# Patient Record
Sex: MALE | Race: WHITE | NOT HISPANIC OR LATINO | Employment: FULL TIME | URBAN - METROPOLITAN AREA
[De-identification: names, ages, dates, MRNs, and addresses within clinical notes are randomized per-mention and may not be internally consistent; named-entity substitution may affect disease eponyms.]

---

## 2021-10-07 ENCOUNTER — OFFICE VISIT (OUTPATIENT)
Dept: URGENT CARE | Facility: CLINIC | Age: 21
End: 2021-10-07
Payer: COMMERCIAL

## 2021-10-07 VITALS
BODY MASS INDEX: 24.93 KG/M2 | HEART RATE: 120 BPM | OXYGEN SATURATION: 95 % | SYSTOLIC BLOOD PRESSURE: 142 MMHG | TEMPERATURE: 101.4 F | WEIGHT: 149.6 LBS | HEIGHT: 65 IN | DIASTOLIC BLOOD PRESSURE: 80 MMHG | RESPIRATION RATE: 16 BRPM

## 2021-10-07 DIAGNOSIS — R50.9 FEVER, UNSPECIFIED FEVER CAUSE: ICD-10-CM

## 2021-10-07 DIAGNOSIS — J36 PERITONSILLAR ABSCESS: ICD-10-CM

## 2021-10-07 PROCEDURE — 99203 OFFICE O/P NEW LOW 30 MIN: CPT | Performed by: FAMILY MEDICINE

## 2021-10-07 RX ORDER — AMOXICILLIN AND CLAVULANATE POTASSIUM 875; 125 MG/1; MG/1
1 TABLET, FILM COATED ORAL
COMMUNITY
Start: 2021-09-27

## 2021-10-07 RX ORDER — PREDNISONE 10 MG/1
TABLET ORAL
COMMUNITY
Start: 2021-09-25

## 2021-10-07 RX ORDER — ASCORBIC ACID 100 MG
1 TABLET,CHEWABLE ORAL DAILY
COMMUNITY

## 2021-10-07 ASSESSMENT — ENCOUNTER SYMPTOMS
SORE THROAT: 1
TROUBLE SWALLOWING: 1
HOARSE VOICE: 1
NECK PAIN: 1
FEVER: 1

## 2021-10-08 NOTE — PROGRESS NOTES
"Subjective:   Pete Rosa is a 20 y.o. male who presents for Sinus Problem (sore throat, fever, congested, ear pain , loss of appetitie )        Pharyngitis   Chronicity: 2 weeks, worse pain today, has been able to eat or drink from odynodysphagia past 2 days, has been taking Augmentin and previous oral steroids. The problem has been rapidly worsening. The maximum temperature recorded prior to his arrival was 101 - 101.9 F. Associated symptoms include a hoarse voice, neck pain and trouble swallowing. Treatments tried: Antibiotics, steroids. The treatment provided no relief.     PMH:  has no past medical history on file.  MEDS:   Current Outpatient Medications:   •  amoxicillin-clavulanate (AUGMENTIN) 875-125 MG Tab, Take 1 Tablet by mouth. FOR 7 DAYS, Disp: , Rfl:   •  predniSONE (DELTASONE) 10 MG Tab, take 4 tablets by mouth WITH BREAKFAST DAILY FOR 3 DAYS THEN DECR...  (REFER TO PRESCRIPTION NOTES)., Disp: , Rfl:   ALLERGIES: No Known Allergies  SURGHX: History reviewed. No pertinent surgical history.  SOCHX:    FH: History reviewed. No pertinent family history.  Review of Systems   Constitutional: Positive for fever and malaise/fatigue.   HENT: Positive for hoarse voice, sore throat and trouble swallowing.    Musculoskeletal: Positive for neck pain.        Objective:   /80   Pulse (!) 120   Temp (!) 38.6 °C (101.4 °F) (Temporal)   Resp 16   Ht 1.651 m (5' 5\")   Wt 67.9 kg (149 lb 9.6 oz)   SpO2 95%   BMI 24.89 kg/m²   Physical Exam  Vitals and nursing note reviewed.   Constitutional:       Appearance: He is well-developed.      Comments: Unable to close mouth, high potato voice, hoarseness   HENT:      Head: Normocephalic and atraumatic.      Right Ear: External ear normal.      Left Ear: External ear normal.      Nose: Nose normal.      Mouth/Throat:      Mouth: Mucous membranes are moist.      Pharynx: Pharyngeal swelling and uvula swelling present.      Tonsils: Tonsillar exudate and tonsillar " abscess present.   Eyes:      Conjunctiva/sclera: Conjunctivae normal.   Cardiovascular:      Rate and Rhythm: Normal rate.   Pulmonary:      Effort: Pulmonary effort is normal. No respiratory distress.      Breath sounds: Normal breath sounds.   Abdominal:      General: There is no distension.   Musculoskeletal:         General: Normal range of motion.   Skin:     General: Skin is warm and dry.   Neurological:      General: No focal deficit present.      Mental Status: He is alert and oriented to person, place, and time. Mental status is at baseline.      Gait: Gait (gait at baseline) normal.   Psychiatric:         Judgment: Judgment normal.           Assessment/Plan:   1. Peritonsillar abscess    2. Fever, unspecified fever cause    Other orders  - amoxicillin-clavulanate (AUGMENTIN) 875-125 MG Tab; Take 1 Tablet by mouth. FOR 7 DAYS  - predniSONE (DELTASONE) 10 MG Tab; take 4 tablets by mouth WITH BREAKFAST DAILY FOR 3 DAYS THEN DECR...  (REFER TO PRESCRIPTION NOTES).        Medical Decision Making/Course:  In the context of the clinical exam concerning for a peritonsillar abscess with findings of fever, tachycardia, inability to maintain oral intake from odynophagia despite a trial of outpatient antibiotics of Augmentin and prednisone, emergency department evaluation management is warranted for consideration of further imaging, IV antibiotics and fluids, and consideration of emergent otolaryngology consultation.  The patient deferred EMS ambulance transfer requested transfer by private vehicle.  The [facility] emergency department was advised.  In the course of preparing for this visit with review of the pertinent past medical history, recent and past clinic visits, current medications, and performing chart, immunization, medical history and medication reconciliation, and in the further course of obtaining the current history pertinent to the clinic visit today, performing an exam and  evaluation, ordering and independently evaluating labs, tests, and/or procedures, prescribing any recommended new medications as noted above, providing any pertinent counseling and education and recommending further coordination of care, at least 20 minutes of total time were spent during this encounter.      Discussed close monitoring, return precautions, and supportive measures of maintaining adequate fluid hydration and caloric intake, relative rest and symptom management as needed for pain and/or fever.    Differential diagnosis, natural history, supportive care, and indications for immediate follow-up discussed.     Advised the patient to follow-up with the primary care physician for recheck, reevaluation, and consideration of further management.    Please note that this dictation was created using voice recognition software. I have worked with consultants from the vendor as well as technical experts from [employer] to optimize the interface. I have made every reasonable attempt to correct obvious errors, but I expect that there are errors of grammar and possibly content that I did not discover before finalizing the note.